# Patient Record
Sex: FEMALE | Race: WHITE | NOT HISPANIC OR LATINO
[De-identification: names, ages, dates, MRNs, and addresses within clinical notes are randomized per-mention and may not be internally consistent; named-entity substitution may affect disease eponyms.]

---

## 2021-01-27 ENCOUNTER — APPOINTMENT (OUTPATIENT)
Dept: VASCULAR SURGERY | Facility: CLINIC | Age: 26
End: 2021-01-27
Payer: COMMERCIAL

## 2021-01-27 PROCEDURE — 99072 ADDL SUPL MATRL&STAF TM PHE: CPT

## 2021-01-27 PROCEDURE — 99204 OFFICE O/P NEW MOD 45 MIN: CPT

## 2021-03-31 PROBLEM — Z00.00 ENCOUNTER FOR PREVENTIVE HEALTH EXAMINATION: Status: ACTIVE | Noted: 2021-03-31

## 2021-04-30 ENCOUNTER — APPOINTMENT (OUTPATIENT)
Dept: VASCULAR SURGERY | Facility: CLINIC | Age: 26
End: 2021-04-30
Payer: COMMERCIAL

## 2021-04-30 PROCEDURE — 99213 OFFICE O/P EST LOW 20 MIN: CPT

## 2021-04-30 PROCEDURE — 99072 ADDL SUPL MATRL&STAF TM PHE: CPT

## 2022-08-18 ENCOUNTER — TRANSCRIPTION ENCOUNTER (OUTPATIENT)
Age: 27
End: 2022-08-18

## 2022-12-22 ENCOUNTER — APPOINTMENT (OUTPATIENT)
Dept: PLASTIC SURGERY | Facility: CLINIC | Age: 27
End: 2022-12-22

## 2023-01-15 ENCOUNTER — NON-APPOINTMENT (OUTPATIENT)
Age: 28
End: 2023-01-15

## 2023-01-20 ENCOUNTER — APPOINTMENT (OUTPATIENT)
Dept: PLASTIC SURGERY | Facility: CLINIC | Age: 28
End: 2023-01-20
Payer: SELF-PAY

## 2023-01-20 VITALS
WEIGHT: 135 LBS | HEART RATE: 85 BPM | BODY MASS INDEX: 22.49 KG/M2 | SYSTOLIC BLOOD PRESSURE: 130 MMHG | DIASTOLIC BLOOD PRESSURE: 86 MMHG | OXYGEN SATURATION: 99 % | RESPIRATION RATE: 22 BRPM | HEIGHT: 65 IN

## 2023-01-20 PROCEDURE — 99203 OFFICE O/P NEW LOW 30 MIN: CPT

## 2023-02-27 NOTE — ASSESSMENT
[FreeTextEntry1] : MANJINDER is prepared for bilateral lollipop mastopexy in the operating room under general anesthetic as she is not interested in being awake in the office for the procedure The risks, benefits, alternatives, limitations and the permanent scars were outlined with the patient. All of MANJINDER 's questions and concerns were addressed and answered completely MANJINDER has sn n 23 cm bilaterally  but has macro areola and constriction with small breast s limitations discussed

## 2023-02-27 NOTE — HISTORY OF PRESENT ILLNESS
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  she desires correction with surgery and no implants The risks, benefits, alternatives, limitations and the permanent scars were outlined with the patient. All of MANJINDER 's questions and concerns were addressed and answered completely The instructions were reviewed in detail with MANJINDER.

## 2023-04-10 RX ORDER — NORETHINDRONE ACETATE AND ETHINYL ESTRADIOL AND FERROUS FUMARATE 1MG-20(24)
KIT ORAL
Refills: 0 | Status: ACTIVE | COMMUNITY

## 2023-04-19 ENCOUNTER — APPOINTMENT (OUTPATIENT)
Dept: PLASTIC SURGERY | Facility: HOSPITAL | Age: 28
End: 2023-04-19
Payer: SELF-PAY

## 2023-04-19 ENCOUNTER — TRANSCRIPTION ENCOUNTER (OUTPATIENT)
Age: 28
End: 2023-04-19

## 2023-04-19 PROCEDURE — 19316 MASTOPEXY: CPT | Mod: 50

## 2023-04-24 ENCOUNTER — NON-APPOINTMENT (OUTPATIENT)
Age: 28
End: 2023-04-24

## 2023-04-25 ENCOUNTER — APPOINTMENT (OUTPATIENT)
Dept: PLASTIC SURGERY | Facility: CLINIC | Age: 28
End: 2023-04-25
Payer: SELF-PAY

## 2023-04-25 VITALS
SYSTOLIC BLOOD PRESSURE: 123 MMHG | HEART RATE: 90 BPM | RESPIRATION RATE: 20 BRPM | DIASTOLIC BLOOD PRESSURE: 77 MMHG | OXYGEN SATURATION: 100 % | TEMPERATURE: 98.5 F

## 2023-04-25 PROCEDURE — 99024 POSTOP FOLLOW-UP VISIT: CPT

## 2023-05-11 ENCOUNTER — APPOINTMENT (OUTPATIENT)
Dept: PLASTIC SURGERY | Facility: CLINIC | Age: 28
End: 2023-05-11
Payer: SELF-PAY

## 2023-05-11 DIAGNOSIS — N64.81 PTOSIS OF BREAST: ICD-10-CM

## 2023-05-11 PROCEDURE — 99024 POSTOP FOLLOW-UP VISIT: CPT

## 2023-05-16 NOTE — HISTORY OF PRESENT ILLNESS
[FreeTextEntry1] : MANJINDER is doing well after lollipop mastopexy 4/19 The patient denies fever,chills, shortness of breath, chest pain, calf pain MANJINDER  has had uncomplicated recovery from surgery and anesthesia MANJINDER has had no irregular heart beats during surgery or recovery.  She is happy with the results of the surgery

## 2023-05-16 NOTE — ASSESSMENT
[FreeTextEntry1] : .comp.  excellent post operative result  pt is pleased All of MANJINDER 's questions and concerns were addressed and answered completely The instructions were reviewed in detail with MANJINDER. MANJINDER will return to the office for a post procedure visit 3 weeks

## 2023-08-04 PROBLEM — N64.81 PTOSIS OF BREAST: Status: ACTIVE | Noted: 2023-02-27

## 2023-08-04 NOTE — HISTORY OF PRESENT ILLNESS
[FreeTextEntry1] : MANJINDER seen c f she is doing very well after mastopexy MANJINDER  has had uncomplicated recovery from surgery and anesthesia 4/19/23.  The patient denies fever,chills, shortness of breath, chest pain, calf pain good shape and symmetry  scars well healed

## 2023-08-04 NOTE — ASSESSMENT
[FreeTextEntry1] : MANJINDER is doing well All of MANJINDER 's questions and concerns were addressed and answered completely The instructions were reviewed in detail with MANJINDER. MANJINDER will return to the office for a post procedure visit as needed